# Patient Record
Sex: FEMALE | Race: WHITE | NOT HISPANIC OR LATINO | Employment: OTHER | URBAN - METROPOLITAN AREA
[De-identification: names, ages, dates, MRNs, and addresses within clinical notes are randomized per-mention and may not be internally consistent; named-entity substitution may affect disease eponyms.]

---

## 2022-06-01 RX ORDER — ESOMEPRAZOLE MAGNESIUM 40 MG/1
40 CAPSULE, DELAYED RELEASE ORAL 2 TIMES DAILY
COMMUNITY

## 2022-06-01 RX ORDER — METOPROLOL TARTRATE 50 MG/1
1 TABLET ORAL 2 TIMES DAILY
COMMUNITY
Start: 2021-09-07

## 2022-06-01 RX ORDER — CALCITRIOL 0.25 UG/1
1 CAPSULE ORAL DAILY
COMMUNITY
Start: 2021-10-12

## 2022-06-01 RX ORDER — HYDROCORTISONE 25 MG/G
1 CREAM TOPICAL 2 TIMES DAILY
COMMUNITY
Start: 2021-09-10

## 2022-06-01 RX ORDER — DABIGATRAN ETEXILATE 75 MG/1
1 CAPSULE ORAL DAILY
COMMUNITY
Start: 2021-08-02 | End: 2023-10-09 | Stop reason: ALTCHOICE

## 2022-06-01 RX ORDER — AMIODARONE HYDROCHLORIDE 200 MG/1
1 TABLET ORAL DAILY
COMMUNITY
Start: 2021-08-09

## 2022-06-01 RX ORDER — TRAMADOL HYDROCHLORIDE 50 MG/1
50 TABLET ORAL EVERY 6 HOURS PRN
COMMUNITY
Start: 2022-05-23 | End: 2023-01-31 | Stop reason: ALTCHOICE

## 2022-06-01 RX ORDER — AMLODIPINE BESYLATE 10 MG/1
1 TABLET ORAL DAILY
COMMUNITY
Start: 2021-09-07

## 2022-06-01 RX ORDER — SUCRALFATE 1 G/10ML
SUSPENSION ORAL
COMMUNITY
Start: 2022-02-14

## 2022-06-01 RX ORDER — HYDRALAZINE HYDROCHLORIDE 25 MG/1
25 TABLET, FILM COATED ORAL 2 TIMES DAILY
COMMUNITY
Start: 2021-06-30 | End: 2023-10-09

## 2022-06-01 RX ORDER — ONDANSETRON 4 MG/1
4 TABLET, ORALLY DISINTEGRATING ORAL EVERY 8 HOURS PRN
COMMUNITY
Start: 2022-04-11

## 2022-06-04 PROBLEM — N18.4 ANEMIA IN STAGE 4 CHRONIC KIDNEY DISEASE: Status: ACTIVE | Noted: 2022-06-04

## 2022-06-04 PROBLEM — Z79.1 LONG TERM (CURRENT) USE OF NON-STEROIDAL ANTI-INFLAMMATORIES (NSAID): Status: ACTIVE | Noted: 2022-06-04

## 2022-06-04 PROBLEM — N25.81 SECONDARY HYPERPARATHYROIDISM OF RENAL ORIGIN: Status: ACTIVE | Noted: 2022-06-04

## 2022-06-04 PROBLEM — R80.1 PERSISTENT PROTEINURIA: Status: ACTIVE | Noted: 2022-06-04

## 2022-06-04 PROBLEM — N18.4 CKD (CHRONIC KIDNEY DISEASE) STAGE 4, GFR 15-29 ML/MIN: Status: ACTIVE | Noted: 2022-06-04

## 2022-06-04 PROBLEM — I10 PRIMARY HYPERTENSION: Status: ACTIVE | Noted: 2022-06-04

## 2022-06-04 PROBLEM — D63.1 ANEMIA IN STAGE 4 CHRONIC KIDNEY DISEASE: Status: ACTIVE | Noted: 2022-06-04

## 2022-06-04 NOTE — PROGRESS NOTES
Pt Name:  Kateryna De Guzman  Pt :  1945  Pt MRN:  [id number]    Date: 2022    Reason for visit:     Follow up visit for Chronic Kidney Disease.    Serum creatinine 2.66, GFR 17, CKD stage 4.    Chief Complaint:     The patient denies any complaints today.      Assessment & Plan:      Problem #1.  Primary Hypertension    Assessment:     Controlled   Plan:     2 g sodium diet, amlodipine 10 mg by mouth daily, hydralazine 25 mg by mouth twice daily, metoprolol 50 mg by mouth twice daily.     Problem #2.  Long-term NSAID Use    Assessment:     Contributing to some extent to the development of the chronic kidney disease.     Plan:    The patient has been advised and continues to avoid the use of NSAIDs.     Problem #3.  Chronic Kidney Disease Stage 4    Assessment:     Stable and asymptomatic   Plan:    Conservative, non-dialysis, nephrologic managements.    Continue renal nutrition and fluid managements.    Continue to provide the patient with verbal instruction and reading material regarding the chronic kidney disease condition, and especially its associated cardiovascular risks.    Return for follow-up in 3 months with repeat kidney lab work done before the visit.     Problem #4.  Anemia in Chronic Kidney Disease    Assessment:    With the patient currently asymptomatic    A portion of the anemia from time to time is contributed to iron deficiency presumed due to the effects of her platelet inhibitor at the GI level.     Plan:    Repeat hemogram prior to the visit in 3 months.     Problem #5.  Secondary Hyperparathyroidism of Renal Origin.    Assessment:    Treated and asymptomatic at   Plan:    Calcitriol 0.25 mcg by mouth daily.    Repeat calcium, phosphorus, albumin, and intact PTH level prior to the visit in 3 months.     Problem #6.  Persistent Proteinuria    Assessment:    Asymptomatic   Plan:     Repeat random urine protein/creatinine prior to the visit in 3 months.         HPI:    This is the   [facility] [place] visit for this 77-year-old woman referred by Dr. Dimas Cole on 08/26/2015 for further evaluation of reduced kidney function;the most recent blood testing demonstrating a serum creatinine of 1.6 with a calculated glomerular filtration rate of 33 mL/min.at that time.    She does continue to have a great deal refer need to look after her , and knows that she has not been drinking the recommended him amounts of water.    At presentation to the clinic today for followup of the status of her kidney function, she does not have azotemic symptoms. Specifically, she does not have absent appetite, nausea , dyspnea at rest or with modest exertion, shortness of breath awakening her from sleep, or difficulty formulating her thoughts or expressing herself.      From the standpoint of the risk factors for the development of Chronic Kidney Disease, she has an at least 20 year history of hypertension. In addition, she related at the time of her initial visit in 2015, that for about the previous 2 years she had been using Aleve PM at bedtime for sleep. She had ceased smoking inhaled tobacco products 2 years previously. She did have known hypercholesterolemia requiring treatment, and a cardiac catheterization had demonstrated a small coronary artery with significant obstruction that was not amenable to intervention. There was not a significant family history of kidney disease of patient is aware. She had no azotemic symptoms at that time.       History:     Past Medical History:   Diagnosis Date    Chronic kidney disease, unspecified     Diverticulosis     Essential (primary) hypertension     Gastritis 08/05/2014    GERD (gastroesophageal reflux disease)     Internal hemorrhoids     Irritable bowel syndrome without diarrhea     Mixed hyperlipidemia     Paroxysmal atrial fibrillation     Personal history of colonic polyps 12/09/2008    HYPERPLASTIC POLYPS    Personal history of  colonic polyps 08/30/2011    TUBULAR ADENOMA,MULTIPLE HYPERPLASTIC POLYPS    Personal history of colonic polyps 08/05/2014    3 hyperplastic    Personal history of colonic polyps 01/17/2017    2 hyperplastic,1 tubular adenoma    Personal history of colonic polyps 04/09/2019    2 tubular adenomous, 2 hyperplastic     Past Surgical History:   Procedure Laterality Date    CARDIAC CATHETERIZATION      CARDIAC PACEMAKER PLACEMENT  04/2016    CARDIOVERSION      CATARACT EXTRACTION      CHOLECYSTECTOMY      COLONOSCOPY      4/9/19, 1/17/2017,8/5/2014,8/30/2011,12/9/2008    ESOPHAGOGASTRODUODENOSCOPY      4/9/19, 1/17/2017,8/5/2014    HYSTERECTOMY       Family History   Problem Relation Age of Onset    Kidney disease Mother     Cancer Father         colon    Heart failure Father      Social History     Substance and Sexual Activity   Alcohol Use Yes    Alcohol/week: 2.0 standard drinks    Types: 2 Glasses of wine per week     Social History     Substance and Sexual Activity   Drug Use Not Currently     Social History     Substance and Sexual Activity   Sexual Activity Not Currently     reports previously being sexually active.  Social History     Tobacco Use   Smoking Status Former Smoker    Quit date: 1/19/2012    Years since quitting: 10.3   Smokeless Tobacco Never Used       Allergies:    Review of patient's allergies indicates:   Allergen Reactions    Iodine and iodide containing products     Levofloxacin     Penicillins          Current Outpatient Medications:     amiodarone (PACERONE) 200 MG Tab, Take 1 tablet by mouth once daily., Disp: , Rfl:     amLODIPine (NORVASC) 10 MG tablet, Take 1 tablet by mouth once daily., Disp: , Rfl:     calcitRIOL (ROCALTROL) 0.25 MCG Cap, Take 1 capsule by mouth once daily., Disp: , Rfl:     dabigatran etexilate (PRADAXA) 75 mg Cap, Take 1 capsule by mouth once daily., Disp: , Rfl:     desvenlafaxine succinate (PRISTIQ) 100 MG Tb24, Take 100 mg by mouth once  daily., Disp: , Rfl:     esomeprazole (NEXIUM) 40 MG capsule, Take 40 mg by mouth 2 (two) times a day., Disp: , Rfl:     famotidine (PEPCID) 40 MG tablet, Take 40 mg by mouth once daily., Disp: , Rfl:     ferrous sulfate (FEOSOL) 325 mg (65 mg iron) Tab tablet, Take 325 mg by mouth 3 (three) times daily., Disp: , Rfl:     furosemide (LASIX) 20 MG tablet, Take 20 mg by mouth once daily., Disp: , Rfl:     hydrALAZINE (APRESOLINE) 25 MG tablet, Take 25 mg by mouth 2 (two) times a day., Disp: , Rfl:     metoprolol tartrate (LOPRESSOR) 50 MG tablet, Take 1 tablet by mouth 2 (two) times daily., Disp: , Rfl:     omega-3 fatty acids-fish oil 340-1,000 mg Cap, Take 2 capsules by mouth 2 (two) times a day., Disp: , Rfl:     sucralfate (CARAFATE) 100 mg/mL suspension, TAKE 10MLs BY MOUTH FOUR TIMES DAILY BEFORE MEALS AND nightly AS NEEDED], Disp: , Rfl:     suvorexant (BELSOMRA) 10 mg Tab, Take by mouth every evening., Disp: , Rfl:     traMADoL (ULTRAM) 50 mg tablet, Take 50 mg by mouth every 6 (six) hours as needed., Disp: , Rfl:     vitamins  A,C,E-zinc-copper 14,320-226-200 unit-mg-unit Cap, Take 2 capsules by mouth once daily., Disp: , Rfl:     hydrocortisone 2.5 % cream, Apply 1 application topically 2 (two) times daily., Disp: , Rfl:     ondansetron (ZOFRAN-ODT) 4 MG TbDL, Take 4 mg by mouth every 8 (eight) hours as needed., Disp: , Rfl:     ROS:     Constitutional:  Denies fever or chills   Eyes:  Denies change in visual acuity   HENT:  Denies nasal congestion or sore throat   Respiratory:  As in the history of the present illness.   Cardiovascular:  As in the history of the present illness.   GI:  As in the history of the present illness.    Musculoskeletal:  Denies back pain or joint pain   Integument:  Denies rash   Neurologic:  Denies headache, focal weakness or sensory changes   Endocrine:  Denies polyuria or polydipsia   Lymphatic:  Denies swollen glands   Psychiatric:  Denies depression or  "anxiety    Physical Exam:     Vitals:   Vitals:    06/08/22 1352   BP: 127/81   Pulse: 65   Temp: 98 °F (36.7 °C)   SpO2: 96%   Weight: 64 kg (141 lb)   Height: 5' 4" (1.626 m)     Body mass index is 24.2 kg/m².    Constitutional:  Well developed, well nourished, and in no acute distress   Eyes:  PERRLA, conjunctiva normal   HENT:  Atraumatic, external ears normal, nose normal.  Neck: There is no jugular venous distension or thyromegaly.   Respiratory:  No respiratory distress, normal breath sounds, no rales, no wheezing   Cardiovascular:  Normal rate,and a regular rhythm, no murmurs, no gallops, no rub, and no edema.  GI:  Normal bowel sounds.  Musculoskeletal:  No deformities.   Neurologic:  Alert & oriented x 3, CN 2-12 normal, normal motor function, and no asterixis.   Psychiatric:  Speech and behavior appropriate.      Labs/Tests:    Date:     Electrolytes (Na/K/Cl/CO2) = 138/4.1/104/25  BUN/Creat/GFR = 36/2.66/17  Ca/Phos/Alb/PTH = 9.9/4.2//4.8  U Prot/creat = 0.8  Glucose/A1c =  105/  Hgb/Fe/Ferr/Tsat = 11.4/49/10/12         Follow Up:      Return for follow-up in 3 months with repeat kidney lab work done before the visit.      This note was created using the voice recognition software currently available to the Medical Staff of the [facility] and its health care facilities. All of the best efforts undertaken to edit the product of that use shall necessarily fall short from time to time. Viewers and reviewers of the product of its use are encouraged to contact this provider for clarification when, and if, the product message is unclear.  "

## 2022-06-08 ENCOUNTER — OFFICE VISIT (OUTPATIENT)
Dept: NEPHROLOGY | Facility: CLINIC | Age: 77
End: 2022-06-08
Payer: MEDICARE

## 2022-06-08 VITALS
OXYGEN SATURATION: 96 % | HEART RATE: 65 BPM | WEIGHT: 141 LBS | HEIGHT: 64 IN | SYSTOLIC BLOOD PRESSURE: 127 MMHG | DIASTOLIC BLOOD PRESSURE: 81 MMHG | BODY MASS INDEX: 24.07 KG/M2 | TEMPERATURE: 98 F

## 2022-06-08 DIAGNOSIS — Z79.1 LONG TERM (CURRENT) USE OF NON-STEROIDAL ANTI-INFLAMMATORIES (NSAID): ICD-10-CM

## 2022-06-08 DIAGNOSIS — N18.4 CKD (CHRONIC KIDNEY DISEASE) STAGE 4, GFR 15-29 ML/MIN: Primary | ICD-10-CM

## 2022-06-08 DIAGNOSIS — R80.1 PERSISTENT PROTEINURIA: ICD-10-CM

## 2022-06-08 DIAGNOSIS — N25.81 SECONDARY HYPERPARATHYROIDISM OF RENAL ORIGIN: ICD-10-CM

## 2022-06-08 DIAGNOSIS — I10 PRIMARY HYPERTENSION: ICD-10-CM

## 2022-06-08 DIAGNOSIS — D63.1 ANEMIA IN STAGE 4 CHRONIC KIDNEY DISEASE: ICD-10-CM

## 2022-06-08 DIAGNOSIS — N18.4 ANEMIA IN STAGE 4 CHRONIC KIDNEY DISEASE: ICD-10-CM

## 2022-06-08 PROCEDURE — 1101F PR PT FALLS ASSESS DOC 0-1 FALLS W/OUT INJ PAST YR: ICD-10-PCS | Mod: CPTII,,, | Performed by: INTERNAL MEDICINE

## 2022-06-08 PROCEDURE — 3288F PR FALLS RISK ASSESSMENT DOCUMENTED: ICD-10-PCS | Mod: CPTII,,, | Performed by: INTERNAL MEDICINE

## 2022-06-08 PROCEDURE — 3074F SYST BP LT 130 MM HG: CPT | Mod: CPTII,,, | Performed by: INTERNAL MEDICINE

## 2022-06-08 PROCEDURE — 1159F MED LIST DOCD IN RCRD: CPT | Mod: CPTII,,, | Performed by: INTERNAL MEDICINE

## 2022-06-08 PROCEDURE — 3074F PR MOST RECENT SYSTOLIC BLOOD PRESSURE < 130 MM HG: ICD-10-PCS | Mod: CPTII,,, | Performed by: INTERNAL MEDICINE

## 2022-06-08 PROCEDURE — 3079F PR MOST RECENT DIASTOLIC BLOOD PRESSURE 80-89 MM HG: ICD-10-PCS | Mod: CPTII,,, | Performed by: INTERNAL MEDICINE

## 2022-06-08 PROCEDURE — 1159F PR MEDICATION LIST DOCUMENTED IN MEDICAL RECORD: ICD-10-PCS | Mod: CPTII,,, | Performed by: INTERNAL MEDICINE

## 2022-06-08 PROCEDURE — 99214 PR OFFICE/OUTPT VISIT, EST, LEVL IV, 30-39 MIN: ICD-10-PCS | Mod: ,,, | Performed by: INTERNAL MEDICINE

## 2022-06-08 PROCEDURE — 1101F PT FALLS ASSESS-DOCD LE1/YR: CPT | Mod: CPTII,,, | Performed by: INTERNAL MEDICINE

## 2022-06-08 PROCEDURE — 3288F FALL RISK ASSESSMENT DOCD: CPT | Mod: CPTII,,, | Performed by: INTERNAL MEDICINE

## 2022-06-08 PROCEDURE — 3079F DIAST BP 80-89 MM HG: CPT | Mod: CPTII,,, | Performed by: INTERNAL MEDICINE

## 2022-06-08 PROCEDURE — 99214 OFFICE O/P EST MOD 30 MIN: CPT | Mod: ,,, | Performed by: INTERNAL MEDICINE

## 2022-06-08 RX ORDER — FUROSEMIDE 20 MG/1
20 TABLET ORAL DAILY
COMMUNITY
End: 2023-10-09 | Stop reason: ALTCHOICE

## 2022-06-08 RX ORDER — DESVENLAFAXINE 100 MG/1
75 TABLET, EXTENDED RELEASE ORAL DAILY
COMMUNITY
End: 2023-10-09 | Stop reason: ALTCHOICE

## 2022-06-08 RX ORDER — FAMOTIDINE 40 MG/1
40 TABLET, FILM COATED ORAL DAILY
COMMUNITY

## 2022-06-08 RX ORDER — FERROUS SULFATE 325(65) MG
325 TABLET ORAL DAILY
COMMUNITY

## 2022-06-08 NOTE — PATIENT INSTRUCTIONS
The patient has been provided with their current level of kidney function including eGFR and creatinine, and she is as return for follow-up in 3 months with repeat kidney lab work done before the visit..    In the meantime, she is reminded that she needs to take in the recommended 72 oz of fluid a day in order to avoid development of dehydration says her current laboratory data suggest that she may have done.    Is also said that she is having a bit of problem with urinary incontinence and is feeling washed out.  She has had is suggested seen Dr. Rushing to be certain she does not have a new urinary tract infection, and to discuss with him whether or not there might be some help for bladder function.    Skin also to restart taking ferrous sulfate 325 mg a day because the iron deficiency and low transferrin saturation.       We discussed the potential for common complications of CKD including anemia, electrolyte abnormalities, abnormal fluid balance, mineral bone disease and malnutrition.     We discussed strategies to slow the progression of their kidney disease including:  Avoid nephrotoxic agents. Avoid over-the-counter and prescription NSAIDs (Ibuprofren, Motrin, Naproxyn, Aleve, Mobic, Celebrex, Toradol, Advil). All of these can worsen kidney function, elevate BP, cause fluid retention/swelling and elevate potassium. Avoid iodine contrast agents and gadolinium, which can worsen kidney function and/or cause kidney failure. Avoid phosphosoda bowel preps which can worsen kidney function.  Work to improve modifiable risk factors. Aim for good control of blood glucose without episodes of hypoglycemia. Notify the provider managing your diabetes if your blood glucose < 60. Aim for good blood pressure control without episodes of hypotension. Call the office if your systolic blood pressure is consistently < 110. Aim for good control of your cholesterol.  AIC goal <7.0  BP goal <130/80  LDL chol goal <70        Keeping  these in goal range will help prevent progression of cardiovascular disease and chronic kidney disease.     We discussed dietary modifications:  Low sodium diet: 2 gm/d or less  Limit/avoid high potassium foods  Avoid potassium containing salt substitutes  Limit/avoid high phosphorus foods  Limit daily protein intake to 0.8-1 gm/kg of your ideal body weight.     We discussed lifestyle modifications:  Make sure you are drinking plenty of fluids--64 ounces (1/2 gallon) daily  Exercise at least 30 minutes 5 x per week (total 150 minutes per week), example brisk walking  Achieve and maintain a healthy weight (BMI 20-25)  Limit alcohol consumption to <2 drinks per day  Stop smoking  Make sure you stay current on important vaccines-- pneumonia vaccines (Pneumovax and Prevnar), flu vaccine, Hepatitis B (especially patients nearing renal replacement therapy and planning hemodialysis) and Covid-19 vaccine.      Recommendations:  Monitor your BP at home daily and record.  Bring readings to your next appt.  Call the office if your BP is persistently >130/80.  Seek urgent medical attention with signs and symptoms of uremia - extreme weakness, fatigue, confusion, anorexia, metallic taste in mouth, hiccoughs, cramping, itching, chest pain, swelling, or trouble sleeping.

## 2022-10-16 PROBLEM — R80.9 NON-NEPHROTIC RANGE PROTEINURIA: Status: ACTIVE | Noted: 2022-06-04

## 2022-10-16 NOTE — PROGRESS NOTES
Pt Name:  Kateryna De Guzman  Pt :  1945  Pt MRN:  [id number]    Date:  10/18/2022    Reason for visit:     Follow up visit for Chronic Kidney Disease.    Serum creatinine 2.38, GFR 19, CKD stage 4.    Chief Complaint:     The patient denies any complaints today.      Assessment & Plan:      Problem #1.  Primary Hypertension    Assessment:     Controlled   Plan:     2 g sodium diet, amlodipine 10 mg by mouth daily, hydralazine 25 mg by mouth twice daily, metoprolol 50 mg by mouth twice daily.     Problem #2.  Long-term NSAID Use    Assessment:     Contributing to some extent to the development of the chronic kidney disease.     Plan:    The patient has been advised and continues to avoid the use of NSAIDs.     Problem #3.  Chronic Kidney Disease Stage 4    Assessment:     Stable and asymptomatic   Plan:    Conservative, non-dialysis, nephrologic managements.    Continue renal nutrition and fluid managements.    Continue to provide the patient with verbal instruction and reading material regarding the chronic kidney disease condition, and especially its associated cardiovascular risks.    Return for follow-up in 3 months with repeat kidney lab work done before the visit.     Problem #4.  Anemia in Chronic Kidney Disease    Assessment:    With the patient currently asymptomatic    A portion of the anemia from time to time is contributed to iron deficiency presumed due to the effects of her platelet inhibitor at the GI level.     Plan:    Repeat hemogram prior to the visit in 3 months.     Problem #5.  Secondary Hyperparathyroidism of Renal Origin.    Assessment:    Treated and asymptomatic at   Plan:    Calcitriol 0.25 mcg by mouth daily.    Repeat calcium, phosphorus, albumin, and intact PTH level prior to the visit in 3 months.     Problem #6.  Non-Nephrotic Range Proteinuria    Assessment:    Asymptomatic   Plan:     Repeat random urine protein/creatinine prior to the visit in 3 months.         HPI:    This is  the 20th [facility] [place] visit for this 77-year-old woman referred by Dr. Dimas Cole on 08/26/2015 for further evaluation of reduced kidney function;the most recent blood testing demonstrating a serum creatinine of 1.6 with a calculated glomerular filtration rate of 33 mL/min.at that time.    She does continue to have a great deal of work to do looking after her , and knows that she has not been drinking the recommended amounts of water.    In the clinic today for followup of the status of her kidney function, she does not have absent appetite, nausea , dyspnea at rest or with modest exertion, shortness of breath awakening her from sleep, or difficulty formulating her thoughts or expressing herself.      From the standpoint of the risk factors for the development of Chronic Kidney Disease, she has an at least 20 year history of hypertension. In addition, she related at the time of her initial visit in 2015, that for about the previous 2 years she had been using Aleve PM at bedtime for sleep. She had ceased smoking inhaled tobacco products 2 years previously. She did have known hypercholesterolemia requiring treatment, and a cardiac catheterization had demonstrated a small coronary artery with significant obstruction that was not amenable to intervention. There was not a significant family history of kidney disease of patient is aware. She had no azotemic symptoms at that time.       History:     Past Medical History:   Diagnosis Date    Chronic kidney disease, unspecified     Diverticulosis     Essential (primary) hypertension     Gastritis 08/05/2014    GERD (gastroesophageal reflux disease)     Internal hemorrhoids     Irritable bowel syndrome without diarrhea     Mixed hyperlipidemia     Paroxysmal atrial fibrillation     Personal history of colonic polyps 12/09/2008    HYPERPLASTIC POLYPS    Personal history of colonic polyps 08/30/2011    TUBULAR ADENOMA,MULTIPLE HYPERPLASTIC  POLYPS    Personal history of colonic polyps 08/05/2014    3 hyperplastic    Personal history of colonic polyps 01/17/2017    2 hyperplastic,1 tubular adenoma    Personal history of colonic polyps 04/09/2019    2 tubular adenomous, 2 hyperplastic     Past Surgical History:   Procedure Laterality Date    CARDIAC CATHETERIZATION      CARDIAC PACEMAKER PLACEMENT  04/2016    CARDIOVERSION      CATARACT EXTRACTION      CHOLECYSTECTOMY      COLONOSCOPY      4/9/19, 1/17/2017,8/5/2014,8/30/2011,12/9/2008    ESOPHAGOGASTRODUODENOSCOPY      4/9/19, 1/17/2017,8/5/2014    HYSTERECTOMY       Family History   Problem Relation Age of Onset    Kidney disease Mother     Cancer Father         colon    Heart failure Father      Social History     Substance and Sexual Activity   Alcohol Use Yes    Alcohol/week: 2.0 standard drinks    Types: 2 Glasses of wine per week     Social History     Substance and Sexual Activity   Drug Use Not Currently     Social History     Substance and Sexual Activity   Sexual Activity Not Currently     reports that she is not currently sexually active.  Social History     Tobacco Use   Smoking Status Former    Types: Cigarettes    Quit date: 1/19/2012    Years since quitting: 10.7   Smokeless Tobacco Never       Allergies:    Review of patient's allergies indicates:   Allergen Reactions    Iodine and iodide containing products     Levofloxacin Rash    Penicillins Rash         Current Outpatient Medications:     amiodarone (PACERONE) 200 MG Tab, Take 1 tablet by mouth once daily., Disp: , Rfl:     amLODIPine (NORVASC) 10 MG tablet, Take 1 tablet by mouth once daily., Disp: , Rfl:     calcitRIOL (ROCALTROL) 0.25 MCG Cap, Take 1 capsule by mouth once daily., Disp: , Rfl:     dabigatran etexilate (PRADAXA) 75 mg Cap, Take 1 capsule by mouth once daily., Disp: , Rfl:     desvenlafaxine succinate (PRISTIQ) 100 MG Tb24, Take 100 mg by mouth once daily., Disp: , Rfl:     esomeprazole (NEXIUM) 40 MG capsule, Take  40 mg by mouth 2 (two) times a day., Disp: , Rfl:     famotidine (PEPCID) 40 MG tablet, Take 40 mg by mouth once daily., Disp: , Rfl:     ferrous sulfate (FEOSOL) 325 mg (65 mg iron) Tab tablet, Take 325 mg by mouth once daily at 6am., Disp: , Rfl:     furosemide (LASIX) 20 MG tablet, Take 20 mg by mouth once daily., Disp: , Rfl:     hydrALAZINE (APRESOLINE) 25 MG tablet, Take 25 mg by mouth 2 (two) times a day., Disp: , Rfl:     hydrocortisone 2.5 % cream, Apply 1 application topically 2 (two) times daily., Disp: , Rfl:     metoprolol tartrate (LOPRESSOR) 50 MG tablet, Take 1 tablet by mouth 2 (two) times daily., Disp: , Rfl:     NON FORMULARY MEDICATION, Take 1 tablet by mouth once daily. prevagen, Disp: , Rfl:     omega-3 fatty acids-fish oil 340-1,000 mg Cap, Take 2 capsules by mouth 2 (two) times a day., Disp: , Rfl:     ondansetron (ZOFRAN-ODT) 4 MG TbDL, Take 4 mg by mouth every 8 (eight) hours as needed., Disp: , Rfl:     sucralfate (CARAFATE) 100 mg/mL suspension, TAKE 10MLs BY MOUTH FOUR TIMES DAILY BEFORE MEALS AND nightly AS NEEDED], Disp: , Rfl:     suvorexant (BELSOMRA) 10 mg Tab, Take by mouth every evening., Disp: , Rfl:     vitamins  A,C,E-zinc-copper 14,320-226-200 unit-mg-unit Cap, Take 2 capsules by mouth once daily., Disp: , Rfl:     traMADoL (ULTRAM) 50 mg tablet, Take 50 mg by mouth every 6 (six) hours as needed., Disp: , Rfl:     ROS:     Constitutional:  Denies fever or chills   Eyes:  Denies change in visual acuity   HENT:  Denies nasal congestion or sore throat   Respiratory:  As in the history of the present illness.   Cardiovascular:  As in the history of the present illness.   GI:  As in the history of the present illness.    Musculoskeletal:  Denies back pain or joint pain   Integument:  Denies rash   Neurologic:  Denies headache, focal weakness or sensory changes   Endocrine:  Denies polyuria or polydipsia   Lymphatic:  Denies swollen glands   Psychiatric:  Denies depression or  "anxiety    Physical Exam:     Vitals:   Vitals:    10/18/22 1537   BP: (!) 146/84   Pulse: 64   SpO2: 97%   Weight: 62.2 kg (137 lb 3.2 oz)   Height: 5' 4" (1.626 m)   PainSc: 0-No pain       Body mass index is 23.55 kg/m².    Constitutional:  Well developed, well nourished, and in no acute distress   Eyes:  PERRLA, conjunctiva normal   HENT:  Atraumatic, external ears normal, nose normal.  Neck: There is no jugular venous distension or thyromegaly.   Respiratory:  No respiratory distress, normal breath sounds, no rales, no wheezing   Cardiovascular:  Normal rate,and a regular rhythm, no murmurs, no gallops, no rub, and trace bilateral ankle edema.  GI:  Normal bowel sounds.  Musculoskeletal:  No deformities.   Neurologic:  Alert & oriented x 3, CN 2-12 normal, normal motor function, and no asterixis.   Psychiatric:  Speech and behavior appropriate.      Labs/Tests:    Date:  10/17/2022    Electrolytes (Na/K/Cl/CO2) = 138/4.2/105/25  BUN/Creat/GFR = 30/2.38/19  Ca/Phos/Alb/PTH = 9.5/4.1/4.2/238  U Prot/creat = 1.2  Hgb = 10.7      Follow Up:      Return for follow-up in 3 months with repeat kidney lab work done before the visit.      This note was created using the voice recognition software currently available to the Medical Staff of the [facility] and its health care facilities. All of the best efforts undertaken to edit the product of that use shall necessarily fall short from time to time. Viewers and reviewers of the product of its use are encouraged to contact this provider for clarification when, and if, the product message is unclear.    "

## 2022-10-18 ENCOUNTER — OFFICE VISIT (OUTPATIENT)
Dept: NEPHROLOGY | Facility: CLINIC | Age: 77
End: 2022-10-18
Payer: MEDICARE

## 2022-10-18 VITALS
SYSTOLIC BLOOD PRESSURE: 146 MMHG | OXYGEN SATURATION: 97 % | HEART RATE: 64 BPM | HEIGHT: 64 IN | DIASTOLIC BLOOD PRESSURE: 84 MMHG | BODY MASS INDEX: 23.42 KG/M2 | WEIGHT: 137.19 LBS

## 2022-10-18 DIAGNOSIS — Z79.1 LONG TERM (CURRENT) USE OF NON-STEROIDAL ANTI-INFLAMMATORIES (NSAID): ICD-10-CM

## 2022-10-18 DIAGNOSIS — D63.1 ANEMIA IN STAGE 4 CHRONIC KIDNEY DISEASE: ICD-10-CM

## 2022-10-18 DIAGNOSIS — N25.81 SECONDARY HYPERPARATHYROIDISM OF RENAL ORIGIN: ICD-10-CM

## 2022-10-18 DIAGNOSIS — N18.4 CKD (CHRONIC KIDNEY DISEASE) STAGE 4, GFR 15-29 ML/MIN: Primary | ICD-10-CM

## 2022-10-18 DIAGNOSIS — R80.9 NON-NEPHROTIC RANGE PROTEINURIA: ICD-10-CM

## 2022-10-18 DIAGNOSIS — I10 PRIMARY HYPERTENSION: ICD-10-CM

## 2022-10-18 DIAGNOSIS — N18.4 ANEMIA IN STAGE 4 CHRONIC KIDNEY DISEASE: ICD-10-CM

## 2022-10-18 PROCEDURE — 1101F PR PT FALLS ASSESS DOC 0-1 FALLS W/OUT INJ PAST YR: ICD-10-PCS | Mod: CPTII,,, | Performed by: INTERNAL MEDICINE

## 2022-10-18 PROCEDURE — 3288F PR FALLS RISK ASSESSMENT DOCUMENTED: ICD-10-PCS | Mod: CPTII,,, | Performed by: INTERNAL MEDICINE

## 2022-10-18 PROCEDURE — 3288F FALL RISK ASSESSMENT DOCD: CPT | Mod: CPTII,,, | Performed by: INTERNAL MEDICINE

## 2022-10-18 PROCEDURE — 3077F SYST BP >= 140 MM HG: CPT | Mod: CPTII,,, | Performed by: INTERNAL MEDICINE

## 2022-10-18 PROCEDURE — 1126F PR PAIN SEVERITY QUANTIFIED, NO PAIN PRESENT: ICD-10-PCS | Mod: CPTII,,, | Performed by: INTERNAL MEDICINE

## 2022-10-18 PROCEDURE — 3079F PR MOST RECENT DIASTOLIC BLOOD PRESSURE 80-89 MM HG: ICD-10-PCS | Mod: CPTII,,, | Performed by: INTERNAL MEDICINE

## 2022-10-18 PROCEDURE — 1126F AMNT PAIN NOTED NONE PRSNT: CPT | Mod: CPTII,,, | Performed by: INTERNAL MEDICINE

## 2022-10-18 PROCEDURE — 3077F PR MOST RECENT SYSTOLIC BLOOD PRESSURE >= 140 MM HG: ICD-10-PCS | Mod: CPTII,,, | Performed by: INTERNAL MEDICINE

## 2022-10-18 PROCEDURE — 1159F MED LIST DOCD IN RCRD: CPT | Mod: CPTII,,, | Performed by: INTERNAL MEDICINE

## 2022-10-18 PROCEDURE — 3079F DIAST BP 80-89 MM HG: CPT | Mod: CPTII,,, | Performed by: INTERNAL MEDICINE

## 2022-10-18 PROCEDURE — 99214 PR OFFICE/OUTPT VISIT, EST, LEVL IV, 30-39 MIN: ICD-10-PCS | Mod: ,,, | Performed by: INTERNAL MEDICINE

## 2022-10-18 PROCEDURE — 99214 OFFICE O/P EST MOD 30 MIN: CPT | Mod: ,,, | Performed by: INTERNAL MEDICINE

## 2022-10-18 PROCEDURE — 1159F PR MEDICATION LIST DOCUMENTED IN MEDICAL RECORD: ICD-10-PCS | Mod: CPTII,,, | Performed by: INTERNAL MEDICINE

## 2022-10-18 PROCEDURE — 1101F PT FALLS ASSESS-DOCD LE1/YR: CPT | Mod: CPTII,,, | Performed by: INTERNAL MEDICINE

## 2022-10-18 NOTE — PATIENT INSTRUCTIONS
The patient is provided with her current level of kidney function, and she is asked to retrun in 3 months with repeat kidney function lab data obtained before the visit.    She has had discussed the 2 types of available dialysis treatments, is provided with a YouTube tutorial on dialysis options, and is referred to the outpatient home dialysis clinic for an in-person review of what is available, especially that of peritoneal dialysis for which she is ideally suited.

## 2023-01-31 ENCOUNTER — OFFICE VISIT (OUTPATIENT)
Dept: NEPHROLOGY | Facility: CLINIC | Age: 78
End: 2023-01-31
Payer: MEDICARE

## 2023-01-31 VITALS
HEIGHT: 64 IN | WEIGHT: 134 LBS | DIASTOLIC BLOOD PRESSURE: 65 MMHG | HEART RATE: 72 BPM | BODY MASS INDEX: 22.88 KG/M2 | SYSTOLIC BLOOD PRESSURE: 140 MMHG | OXYGEN SATURATION: 97 %

## 2023-01-31 DIAGNOSIS — N18.4 ANEMIA IN STAGE 4 CHRONIC KIDNEY DISEASE: ICD-10-CM

## 2023-01-31 DIAGNOSIS — D63.1 ANEMIA IN STAGE 4 CHRONIC KIDNEY DISEASE: ICD-10-CM

## 2023-01-31 DIAGNOSIS — Z79.1 LONG TERM (CURRENT) USE OF NON-STEROIDAL ANTI-INFLAMMATORIES (NSAID): ICD-10-CM

## 2023-01-31 DIAGNOSIS — N25.81 SECONDARY HYPERPARATHYROIDISM OF RENAL ORIGIN: ICD-10-CM

## 2023-01-31 DIAGNOSIS — N18.4 CKD (CHRONIC KIDNEY DISEASE) STAGE 4, GFR 15-29 ML/MIN: Primary | ICD-10-CM

## 2023-01-31 DIAGNOSIS — I10 PRIMARY HYPERTENSION: ICD-10-CM

## 2023-01-31 PROCEDURE — 1101F PT FALLS ASSESS-DOCD LE1/YR: CPT | Mod: CPTII,,, | Performed by: INTERNAL MEDICINE

## 2023-01-31 PROCEDURE — 3077F PR MOST RECENT SYSTOLIC BLOOD PRESSURE >= 140 MM HG: ICD-10-PCS | Mod: CPTII,,, | Performed by: INTERNAL MEDICINE

## 2023-01-31 PROCEDURE — 3288F PR FALLS RISK ASSESSMENT DOCUMENTED: ICD-10-PCS | Mod: CPTII,,, | Performed by: INTERNAL MEDICINE

## 2023-01-31 PROCEDURE — 99214 OFFICE O/P EST MOD 30 MIN: CPT | Mod: ,,, | Performed by: INTERNAL MEDICINE

## 2023-01-31 PROCEDURE — 3077F SYST BP >= 140 MM HG: CPT | Mod: CPTII,,, | Performed by: INTERNAL MEDICINE

## 2023-01-31 PROCEDURE — 3078F DIAST BP <80 MM HG: CPT | Mod: CPTII,,, | Performed by: INTERNAL MEDICINE

## 2023-01-31 PROCEDURE — 3078F PR MOST RECENT DIASTOLIC BLOOD PRESSURE < 80 MM HG: ICD-10-PCS | Mod: CPTII,,, | Performed by: INTERNAL MEDICINE

## 2023-01-31 PROCEDURE — 1159F MED LIST DOCD IN RCRD: CPT | Mod: CPTII,,, | Performed by: INTERNAL MEDICINE

## 2023-01-31 PROCEDURE — 3288F FALL RISK ASSESSMENT DOCD: CPT | Mod: CPTII,,, | Performed by: INTERNAL MEDICINE

## 2023-01-31 PROCEDURE — 1159F PR MEDICATION LIST DOCUMENTED IN MEDICAL RECORD: ICD-10-PCS | Mod: CPTII,,, | Performed by: INTERNAL MEDICINE

## 2023-01-31 PROCEDURE — 99214 PR OFFICE/OUTPT VISIT, EST, LEVL IV, 30-39 MIN: ICD-10-PCS | Mod: ,,, | Performed by: INTERNAL MEDICINE

## 2023-01-31 PROCEDURE — 1101F PR PT FALLS ASSESS DOC 0-1 FALLS W/OUT INJ PAST YR: ICD-10-PCS | Mod: CPTII,,, | Performed by: INTERNAL MEDICINE

## 2023-01-31 RX ORDER — TIZANIDINE 4 MG/1
4 TABLET ORAL NIGHTLY PRN
COMMUNITY
Start: 2022-10-18 | End: 2023-10-09 | Stop reason: ALTCHOICE

## 2023-01-31 NOTE — PATIENT INSTRUCTIONS
The patient has been provided with her current level of kidney function that is a bit improved compared to the last determination.    She is asked to consider returning to the use of her diuretic on an as-needed basis rather than daily, especially as she has no swelling today at all.    In addition, she is reminded to take in 72 oz of fluid a day in order to avoid development of dehydration.    Because of the current potassium level, she is provided with a low-potassium diet and the level will be rechecked when she returns..

## 2023-01-31 NOTE — PROGRESS NOTES
Pt Name:  Kateryna De Guzman  Pt :  1945  Pt MRN:  [id number]    Date:  10/18/2022    Reason for visit:     Follow up visit for Chronic Kidney Disease.    Serum creatinine 3.01, GFR 16, CKD stage 4.    Chief Complaint:     The patient denies any complaints today.      Assessment & Plan:      Problem #1.  Primary Hypertension    Assessment:     Controlled   Plan:     2 g sodium diet, amlodipine 10 mg by mouth daily, hydralazine 25 mg by mouth twice daily, metoprolol 50 mg by mouth twice daily.     Problem #2.  Long-term NSAID Use    Assessment:     Contributing to some extent to the development of the chronic kidney disease.     Plan:    The patient has been advised and continues to avoid the use of NSAIDs.     Problem #3.  Chronic Kidney Disease Stage 4    Assessment:     Stable and asymptomatic   Plan:    Conservative, non-dialysis, nephrologic managements.    Continue renal nutrition and fluid managements.    Continue to provide the patient with verbal instruction and reading material regarding the chronic kidney disease condition, and especially its associated cardiovascular risks.    Return for follow-up in 4 months with repeat kidney lab work done before the visit.     Problem #4.  Anemia in Chronic Kidney Disease    Assessment:    With the patient currently asymptomatic    A portion of the anemia from time to time is contributed to iron deficiency presumed due to the effects of her platelet inhibitor at the GI level.     Plan:    Repeat hemogram prior to the visit in 4 months.     Problem #5.  Secondary Hyperparathyroidism of Renal Origin.    Assessment:    Treated and asymptomatic at   Plan:    Calcitriol 0.25 mcg by mouth daily.    Repeat calcium, phosphorus, albumin, and intact PTH level prior to the visit in 4 months.     Problem #6.  Non-Nephrotic Range Proteinuria    Assessment:    Asymptomatic   Plan:     Repeat random urine protein/creatinine prior to the visit in 4 months.     Problem #6.   Hyperkalemia    Assessment:    Asymptomatic   Plan:   Patient has been provided with a low-potassium diet.      Repeat serum potassium prior to the visit in 4 months.       HPI:    This is the [facility]st [facility] [place] visit for this 77-year-old woman referred by Dr. Dimas Cole on 08/26/2015 for further evaluation of reduced kidney function;the most recent blood testing demonstrating a serum creatinine of 1.6 with a calculated glomerular filtration rate of 33 mL/min.at that time.    She is a bit at loose ends following the death of her .  She is in the course of moving in with her daughter.    At presentation to the clinic today for followup of the status of her kidney function, she does not have azotemic symptoms.  Specifically, she does not have absent appetite, nausea , dyspnea at rest or with modest exertion, shortness of breath awakening her from sleep, or difficulty formulating her thoughts or expressing herself.      From the standpoint of the risk factors for the development of Chronic Kidney Disease, she has an at least 20 year history of hypertension. In addition, she related at the time of her initial visit in 2015, that for about the previous 2 years she had been using Aleve PM at bedtime for sleep. She had ceased smoking inhaled tobacco products 2 years previously. She did have known hypercholesterolemia requiring treatment, and a cardiac catheterization had demonstrated a small coronary artery with significant obstruction that was not amenable to intervention. There was not a significant family history of kidney disease of patient is aware. She had no azotemic symptoms at that time.       History:     Past Medical History:   Diagnosis Date    Chronic kidney disease, unspecified     Diverticulosis     Essential (primary) hypertension     Gastritis 08/05/2014    GERD (gastroesophageal reflux disease)     Internal hemorrhoids     Irritable bowel syndrome without diarrhea      Mixed hyperlipidemia     Paroxysmal atrial fibrillation     Personal history of colonic polyps 2008    HYPERPLASTIC POLYPS    Personal history of colonic polyps 2011    TUBULAR ADENOMA,MULTIPLE HYPERPLASTIC POLYPS    Personal history of colonic polyps 2014    3 hyperplastic    Personal history of colonic polyps 2017    2 hyperplastic,1 tubular adenoma    Personal history of colonic polyps 2019    2 tubular adenomous, 2 hyperplastic     Past Surgical History:   Procedure Laterality Date    CARDIAC CATHETERIZATION      CARDIAC PACEMAKER PLACEMENT  2016    CARDIOVERSION      CATARACT EXTRACTION      CHOLECYSTECTOMY      COLONOSCOPY      19, 2017,2014,2011,2008    ESOPHAGOGASTRODUODENOSCOPY      19, 2017,2014    HYSTERECTOMY       Family History   Problem Relation Age of Onset    Kidney disease Mother     Cancer Father         colon    Heart failure Father      Social History     Substance and Sexual Activity   Alcohol Use Yes    Alcohol/week: 2.0 standard drinks    Types: 2 Glasses of wine per week     Social History     Substance and Sexual Activity   Drug Use Not Currently     Social History     Substance and Sexual Activity   Sexual Activity Not Currently     reports that she is not currently sexually active.  Social History     Tobacco Use   Smoking Status Former    Types: Cigarettes    Quit date: 2012    Years since quittin.0   Smokeless Tobacco Never       Allergies:    Review of patient's allergies indicates:   Allergen Reactions    Iodine and iodide containing products     Levofloxacin Rash    Penicillins Rash         Current Outpatient Medications:     amiodarone (PACERONE) 200 MG Tab, Take 1 tablet by mouth once daily., Disp: , Rfl:     amLODIPine (NORVASC) 10 MG tablet, Take 1 tablet by mouth once daily., Disp: , Rfl:     calcitRIOL (ROCALTROL) 0.25 MCG Cap, Take 1 capsule by mouth once daily., Disp: , Rfl:     dabigatran  etexilate (PRADAXA) 75 mg Cap, Take 1 capsule by mouth once daily., Disp: , Rfl:     desvenlafaxine succinate (PRISTIQ) 100 MG Tb24, Take 100 mg by mouth once daily., Disp: , Rfl:     esomeprazole (NEXIUM) 40 MG capsule, Take 40 mg by mouth 2 (two) times a day., Disp: , Rfl:     famotidine (PEPCID) 40 MG tablet, Take 40 mg by mouth once daily., Disp: , Rfl:     ferrous sulfate (FEOSOL) 325 mg (65 mg iron) Tab tablet, Take 325 mg by mouth once daily at 6am., Disp: , Rfl:     furosemide (LASIX) 20 MG tablet, Take 20 mg by mouth once daily., Disp: , Rfl:     hydrALAZINE (APRESOLINE) 25 MG tablet, Take 25 mg by mouth 2 (two) times a day., Disp: , Rfl:     metoprolol tartrate (LOPRESSOR) 50 MG tablet, Take 1 tablet by mouth 2 (two) times daily., Disp: , Rfl:     NON FORMULARY MEDICATION, Take 1 tablet by mouth once daily. prevagen, Disp: , Rfl:     omega-3 fatty acids-fish oil 340-1,000 mg Cap, Take 2 capsules by mouth 2 (two) times a day., Disp: , Rfl:     sucralfate (CARAFATE) 100 mg/mL suspension, TAKE 10MLs BY MOUTH FOUR TIMES DAILY BEFORE MEALS AND nightly AS NEEDED], Disp: , Rfl:     suvorexant (BELSOMRA) 10 mg Tab, Take by mouth every evening., Disp: , Rfl:     vitamins  A,C,E-zinc-copper 14,320-226-200 unit-mg-unit Cap, Take 2 capsules by mouth once daily., Disp: , Rfl:     hydrocortisone 2.5 % cream, Apply 1 application topically 2 (two) times daily., Disp: , Rfl:     ondansetron (ZOFRAN-ODT) 4 MG TbDL, Take 4 mg by mouth every 8 (eight) hours as needed., Disp: , Rfl:     tiZANidine (ZANAFLEX) 4 MG tablet, Take 4 mg by mouth nightly as needed., Disp: , Rfl:     traMADoL (ULTRAM) 50 mg tablet, Take 50 mg by mouth every 6 (six) hours as needed., Disp: , Rfl:     ROS:     Constitutional:  Denies fever or chills   Eyes:  Denies change in visual acuity   HENT:  Denies nasal congestion or sore throat   Respiratory:  As in the history of the present illness.   Cardiovascular:  As in the history of the present illness.  "  GI:  As in the history of the present illness.    Musculoskeletal:  Denies back pain or joint pain   Integument:  Denies rash   Neurologic:  Denies headache, focal weakness or sensory changes   Endocrine:  Denies polyuria or polydipsia   Lymphatic:  Denies swollen glands   Psychiatric:  Denies depression or anxiety    Physical Exam:     Vitals:   Vitals:    01/31/23 0958   BP: (!) 140/65   Pulse: 72   SpO2: 97%   Weight: 60.8 kg (134 lb)   Height: 5' 4" (1.626 m)       Body mass index is 23 kg/m².    Constitutional:  Well developed, well nourished, and in no acute distress   Eyes:  PERRLA, conjunctiva normal   HENT:  Atraumatic, external ears normal, nose normal.  Neck: There is no jugular venous distension or thyromegaly.   Respiratory:  No respiratory distress, normal breath sounds, no rales, no wheezing   Cardiovascular:  Normal rate,and a regular rhythm, no murmurs, no gallops, no rub, and trace bilateral ankle edema.  GI:  Normal bowel sounds.  Musculoskeletal:  No deformities.   Neurologic:  Alert & oriented x 3, CN 2-12 normal, normal motor function, and no asterixis.   Psychiatric:  Speech and behavior appropriate.      Labs/Tests:    Date:  01/27/2023    Na/K/Cl/CO2 = 139/4.7/105/27  BUN/Creat/GFR = 34/3.01/16  Ca/Phos/Alb/PTH = 9.5/4.2/4.3/226  U Prot/creat = 1.3  Hgb = 9.5      Follow Up:      Return for follow-up in 4 months with repeat kidney lab work done before the visit.      This note was created using the voice recognition software currently available to the Medical Staff of the [facility] and its health care facilities. All of the best efforts undertaken to edit the product of that use shall necessarily fall short from time to time. Viewers and reviewers of the product of its use are encouraged to contact this provider for clarification when, and if, the product message is unclear.      "

## 2023-05-30 ENCOUNTER — OFFICE VISIT (OUTPATIENT)
Dept: NEPHROLOGY | Facility: CLINIC | Age: 78
End: 2023-05-30
Payer: MEDICARE

## 2023-05-30 VITALS
BODY MASS INDEX: 22.88 KG/M2 | HEIGHT: 64 IN | HEART RATE: 65 BPM | SYSTOLIC BLOOD PRESSURE: 135 MMHG | WEIGHT: 134 LBS | OXYGEN SATURATION: 97 % | DIASTOLIC BLOOD PRESSURE: 67 MMHG

## 2023-05-30 DIAGNOSIS — Z79.1 LONG TERM (CURRENT) USE OF NON-STEROIDAL ANTI-INFLAMMATORIES (NSAID): ICD-10-CM

## 2023-05-30 DIAGNOSIS — I10 PRIMARY HYPERTENSION: ICD-10-CM

## 2023-05-30 DIAGNOSIS — N18.5 CKD (CHRONIC KIDNEY DISEASE), SYMPTOM MANAGEMENT ONLY, STAGE 5: Primary | ICD-10-CM

## 2023-05-30 DIAGNOSIS — N18.5 ANEMIA IN STAGE 5 CHRONIC KIDNEY DISEASE, NOT ON CHRONIC DIALYSIS: ICD-10-CM

## 2023-05-30 DIAGNOSIS — N25.81 SECONDARY HYPERPARATHYROIDISM OF RENAL ORIGIN: ICD-10-CM

## 2023-05-30 DIAGNOSIS — D63.1 ANEMIA IN STAGE 5 CHRONIC KIDNEY DISEASE, NOT ON CHRONIC DIALYSIS: ICD-10-CM

## 2023-05-30 DIAGNOSIS — R80.9 NON-NEPHROTIC RANGE PROTEINURIA: ICD-10-CM

## 2023-05-30 PROCEDURE — 3078F PR MOST RECENT DIASTOLIC BLOOD PRESSURE < 80 MM HG: ICD-10-PCS | Mod: CPTII,S$GLB,, | Performed by: INTERNAL MEDICINE

## 2023-05-30 PROCEDURE — 1159F MED LIST DOCD IN RCRD: CPT | Mod: CPTII,S$GLB,, | Performed by: INTERNAL MEDICINE

## 2023-05-30 PROCEDURE — 1159F PR MEDICATION LIST DOCUMENTED IN MEDICAL RECORD: ICD-10-PCS | Mod: CPTII,S$GLB,, | Performed by: INTERNAL MEDICINE

## 2023-05-30 PROCEDURE — 99214 OFFICE O/P EST MOD 30 MIN: CPT | Mod: S$GLB,,, | Performed by: INTERNAL MEDICINE

## 2023-05-30 PROCEDURE — 3288F FALL RISK ASSESSMENT DOCD: CPT | Mod: CPTII,S$GLB,, | Performed by: INTERNAL MEDICINE

## 2023-05-30 PROCEDURE — 1126F PR PAIN SEVERITY QUANTIFIED, NO PAIN PRESENT: ICD-10-PCS | Mod: CPTII,S$GLB,, | Performed by: INTERNAL MEDICINE

## 2023-05-30 PROCEDURE — 1101F PT FALLS ASSESS-DOCD LE1/YR: CPT | Mod: CPTII,S$GLB,, | Performed by: INTERNAL MEDICINE

## 2023-05-30 PROCEDURE — 3075F SYST BP GE 130 - 139MM HG: CPT | Mod: CPTII,S$GLB,, | Performed by: INTERNAL MEDICINE

## 2023-05-30 PROCEDURE — 3075F PR MOST RECENT SYSTOLIC BLOOD PRESS GE 130-139MM HG: ICD-10-PCS | Mod: CPTII,S$GLB,, | Performed by: INTERNAL MEDICINE

## 2023-05-30 PROCEDURE — 1101F PR PT FALLS ASSESS DOC 0-1 FALLS W/OUT INJ PAST YR: ICD-10-PCS | Mod: CPTII,S$GLB,, | Performed by: INTERNAL MEDICINE

## 2023-05-30 PROCEDURE — 3288F PR FALLS RISK ASSESSMENT DOCUMENTED: ICD-10-PCS | Mod: CPTII,S$GLB,, | Performed by: INTERNAL MEDICINE

## 2023-05-30 PROCEDURE — 3078F DIAST BP <80 MM HG: CPT | Mod: CPTII,S$GLB,, | Performed by: INTERNAL MEDICINE

## 2023-05-30 PROCEDURE — 1126F AMNT PAIN NOTED NONE PRSNT: CPT | Mod: CPTII,S$GLB,, | Performed by: INTERNAL MEDICINE

## 2023-05-30 PROCEDURE — 99214 PR OFFICE/OUTPT VISIT, EST, LEVL IV, 30-39 MIN: ICD-10-PCS | Mod: S$GLB,,, | Performed by: INTERNAL MEDICINE

## 2023-05-30 NOTE — PATIENT INSTRUCTIONS
The patient has been provided with her current level of kidney function and she is asked to return for follow-up, this time, in 4 months with repeat kidney lab work done before the visit.    Meanwhile, she continues encouraged to take in 72 oz of fluid a day to avoid development of dehydration and a decrease in kidney function as a consequence.

## 2023-05-30 NOTE — PROGRESS NOTES
Pt Name:  Kateryna De Guzman  Pt :  1945  Pt MRN:  [id number]    Date:  10/18/2022    Reason for visit:     Follow up visit for Chronic Kidney Disease.    Serum creatinine 3.01, GFR 14, CKD stage 5.    Chief Complaint:     The patient denies any complaints today.      Assessment & Plan:      Problem #1.  Primary Hypertension    Assessment:     Controlled   Plan:     2 g sodium diet, amlodipine 10 mg by mouth daily, hydralazine 25 mg by mouth twice daily, metoprolol 50 mg by mouth twice daily.     Problem #2.  Long-term NSAID Use    Assessment:     Contributing to some extent to the development of the chronic kidney disease.     Plan:    The patient has been advised and continues to avoid the use of NSAIDs.     Problem #3.  Chronic Kidney Disease Stage 4    Assessment:     Stable and asymptomatic   Plan:    Conservative, non-dialysis, nephrologic managements.    Continue renal nutrition and fluid managements.    Continue to provide the patient with verbal instruction and reading material regarding the chronic kidney disease condition, and especially its associated cardiovascular risks.    Return for follow-up in 4 months with repeat kidney lab work done before the visit.     Problem #4.  Anemia in Chronic Kidney Disease    Assessment:    With the patient currently asymptomatic    A portion of the anemia from time to time is contributed to iron deficiency presumed due to the effects of her platelet inhibitor at the GI level.     Plan:    Repeat hemogram prior to the visit in 4 months.     Problem #5.  Secondary Hyperparathyroidism of Renal Origin.    Assessment:    Treated and asymptomatic at   Plan:    Calcitriol 0.25 mcg by mouth daily.    Repeat calcium, phosphorus, albumin, and intact PTH level prior to the visit in 4 months.     Problem #6.  Non-Nephrotic Range Proteinuria    Assessment:    Asymptomatic   Plan:     Repeat random urine protein/creatinine prior to the visit in 4 months.     Problem #6.   Hyperkalemia    Assessment:    Asymptomatic   Plan:   Patient has been provided with a low-potassium diet.      Repeat serum potassium prior to the visit in 4 months.       HPI:    This is the 22nd [facility] [place] visit for this 78-year-old woman referred by Dr. Dimas Cole on 08/26/2015 for further evaluation of reduced kidney function;the most recent blood testing demonstrating a serum creatinine of 1.6 with a calculated glomerular filtration rate of 33 mL/min.at that time.    She has moved in with her daughter, who admonishes her regularly about salt restriction and drinking at least 72 ounces of fluid per day..    In the clinic today for followup of the status of her kidney function, she does not have absent appetite, nausea , dyspnea at rest or with modest exertion, shortness of breath awakening her from sleep, or difficulty formulating her thoughts or expressing herself.      From the standpoint of the risk factors for the development of Chronic Kidney Disease, she has an at least 20 year history of hypertension. In addition, she related at the time of her initial visit in 2015, that for about the previous 2 years she had been using Aleve PM at bedtime for sleep. She had ceased smoking inhaled tobacco products 2 years previously. She did have known hypercholesterolemia requiring treatment, and a cardiac catheterization had demonstrated a small coronary artery with significant obstruction that was not amenable to intervention. There was not a significant family history of kidney disease of patient is aware. She had no azotemic symptoms at that time.       History:     Past Medical History:   Diagnosis Date    Chronic kidney disease, unspecified     Diverticulosis     Essential (primary) hypertension     Gastritis 08/05/2014    GERD (gastroesophageal reflux disease)     Internal hemorrhoids     Irritable bowel syndrome without diarrhea     Mixed hyperlipidemia     Paroxysmal atrial  fibrillation     Personal history of colonic polyps 2008    HYPERPLASTIC POLYPS    Personal history of colonic polyps 2011    TUBULAR ADENOMA,MULTIPLE HYPERPLASTIC POLYPS    Personal history of colonic polyps 2014    3 hyperplastic    Personal history of colonic polyps 2017    2 hyperplastic,1 tubular adenoma    Personal history of colonic polyps 2019    2 tubular adenomous, 2 hyperplastic     Past Surgical History:   Procedure Laterality Date    CARDIAC CATHETERIZATION      CARDIAC PACEMAKER PLACEMENT  2016    CARDIOVERSION      CATARACT EXTRACTION      CHOLECYSTECTOMY      COLONOSCOPY      19, 2017,2014,2011,2008    ESOPHAGOGASTRODUODENOSCOPY      19, 2017,2014    HYSTERECTOMY       Family History   Problem Relation Age of Onset    Kidney disease Mother     Cancer Father         colon    Heart failure Father      Social History     Substance and Sexual Activity   Alcohol Use Yes    Alcohol/week: 2.0 standard drinks    Types: 2 Glasses of wine per week     Social History     Substance and Sexual Activity   Drug Use Not Currently     Social History     Substance and Sexual Activity   Sexual Activity Not Currently     reports that she is not currently sexually active.  Social History     Tobacco Use   Smoking Status Former    Types: Cigarettes    Quit date: 2012    Years since quittin.3   Smokeless Tobacco Never       Allergies:    Review of patient's allergies indicates:   Allergen Reactions    Iodine and iodide containing products     Levofloxacin Rash    Penicillins Rash         Current Outpatient Medications:     amiodarone (PACERONE) 200 MG Tab, Take 1 tablet by mouth once daily., Disp: , Rfl:     amLODIPine (NORVASC) 10 MG tablet, Take 1 tablet by mouth once daily., Disp: , Rfl:     calcitRIOL (ROCALTROL) 0.25 MCG Cap, Take 1 capsule by mouth once daily., Disp: , Rfl:     dabigatran etexilate (PRADAXA) 75 mg Cap, Take 1 capsule by  mouth once daily., Disp: , Rfl:     desvenlafaxine succinate (PRISTIQ) 100 MG Tb24, Take 100 mg by mouth once daily., Disp: , Rfl:     esomeprazole (NEXIUM) 40 MG capsule, Take 40 mg by mouth 2 (two) times a day., Disp: , Rfl:     famotidine (PEPCID) 40 MG tablet, Take 40 mg by mouth once daily., Disp: , Rfl:     hydrALAZINE (APRESOLINE) 25 MG tablet, Take 25 mg by mouth 2 (two) times a day., Disp: , Rfl:     metoprolol tartrate (LOPRESSOR) 50 MG tablet, Take 1 tablet by mouth 2 (two) times daily., Disp: , Rfl:     NON FORMULARY MEDICATION, Take 1 tablet by mouth once daily. prevagen, Disp: , Rfl:     omega-3 fatty acids-fish oil 340-1,000 mg Cap, Take 2 capsules by mouth 2 (two) times a day., Disp: , Rfl:     ondansetron (ZOFRAN-ODT) 4 MG TbDL, Take 4 mg by mouth every 8 (eight) hours as needed., Disp: , Rfl:     suvorexant (BELSOMRA) 10 mg Tab, Take by mouth every evening., Disp: , Rfl:     vitamins  A,C,E-zinc-copper 14,320-226-200 unit-mg-unit Cap, Take 2 capsules by mouth once daily., Disp: , Rfl:     ferrous sulfate (FEOSOL) 325 mg (65 mg iron) Tab tablet, Take 325 mg by mouth once daily at 6am., Disp: , Rfl:     furosemide (LASIX) 20 MG tablet, Take 20 mg by mouth once daily., Disp: , Rfl:     hydrocortisone 2.5 % cream, Apply 1 application topically 2 (two) times daily., Disp: , Rfl:     sucralfate (CARAFATE) 100 mg/mL suspension, TAKE 10MLs BY MOUTH FOUR TIMES DAILY BEFORE MEALS AND nightly AS NEEDED], Disp: , Rfl:     tiZANidine (ZANAFLEX) 4 MG tablet, Take 4 mg by mouth nightly as needed., Disp: , Rfl:     ROS:     Constitutional:  Denies fever or chills   Eyes:  Denies change in visual acuity   HENT:  Denies nasal congestion or sore throat   Respiratory:  As in the history of the present illness.   Cardiovascular:  As in the history of the present illness.   GI:  As in the history of the present illness.    Musculoskeletal:  Denies back pain or joint pain   Integument:  Denies rash   Neurologic:  Denies  "headache, focal weakness or sensory changes   Endocrine:  Denies polyuria or polydipsia   Lymphatic:  Denies swollen glands   Psychiatric:  Denies depression or anxiety    Physical Exam:     Vitals:   Vitals:    05/30/23 1406   BP: 135/67   Pulse: 65   SpO2: 97%   Weight: 60.8 kg (134 lb)   Height: 5' 4" (1.626 m)   PainSc: 0-No pain       Body mass index is 23 kg/m².    Constitutional:  Well developed, well nourished, and in no acute distress   Eyes:  PERRLA, conjunctiva normal   HENT:  Atraumatic, external ears normal, nose normal.  Neck: There is no jugular venous distension or thyromegaly.   Respiratory:  No respiratory distress, normal breath sounds, no rales, no wheezing   Cardiovascular:  Normal rate,and a regular rhythm, no murmurs, no gallops, no rub, and trace bilateral ankle edema.  GI:  Normal bowel sounds.  Musculoskeletal:  No deformities.   Neurologic:  Alert & oriented x 3, CN 2-12 normal, normal motor function, and no asterixis.   Psychiatric:  Speech and behavior appropriate.      Labs/Tests:    Date:  05/26/2023    Na/K/Cl/CO2 = 140/4.6/107/24  BUN/Creat/GFR = 46/3.23/14  Ca/Phos/Alb/PTH = 9.1/4.3/4.4/485  U Prot/creat = 1.4  Hgb = 10.5      Follow Up:      Return for follow-up in 4 months with repeat kidney lab work done before the visit.      This note was created using the voice recognition software currently available to the Medical Staff of the [facility] and its health care facilities. All of the best efforts undertaken to edit the product of that use shall necessarily fall short from time to time. Viewers and reviewers of the product of its use are encouraged to contact this provider for clarification when, and if, the product message is unclear.        "

## 2023-10-09 ENCOUNTER — OFFICE VISIT (OUTPATIENT)
Dept: NEPHROLOGY | Facility: CLINIC | Age: 78
End: 2023-10-09
Payer: MEDICARE

## 2023-10-09 VITALS
HEART RATE: 69 BPM | WEIGHT: 136 LBS | SYSTOLIC BLOOD PRESSURE: 152 MMHG | OXYGEN SATURATION: 98 % | HEIGHT: 64 IN | BODY MASS INDEX: 23.22 KG/M2 | DIASTOLIC BLOOD PRESSURE: 82 MMHG

## 2023-10-09 DIAGNOSIS — N18.5 ANEMIA IN STAGE 5 CHRONIC KIDNEY DISEASE, NOT ON CHRONIC DIALYSIS: ICD-10-CM

## 2023-10-09 DIAGNOSIS — D63.1 ANEMIA IN STAGE 5 CHRONIC KIDNEY DISEASE, NOT ON CHRONIC DIALYSIS: ICD-10-CM

## 2023-10-09 DIAGNOSIS — N25.81 SECONDARY HYPERPARATHYROIDISM OF RENAL ORIGIN: ICD-10-CM

## 2023-10-09 DIAGNOSIS — N18.5 CKD (CHRONIC KIDNEY DISEASE), SYMPTOM MANAGEMENT ONLY, STAGE 5: Primary | ICD-10-CM

## 2023-10-09 DIAGNOSIS — R80.9 NON-NEPHROTIC RANGE PROTEINURIA: ICD-10-CM

## 2023-10-09 DIAGNOSIS — I10 PRIMARY HYPERTENSION: ICD-10-CM

## 2023-10-09 DIAGNOSIS — Z79.1 LONG TERM (CURRENT) USE OF NON-STEROIDAL ANTI-INFLAMMATORIES (NSAID): ICD-10-CM

## 2023-10-09 PROCEDURE — 3077F PR MOST RECENT SYSTOLIC BLOOD PRESSURE >= 140 MM HG: ICD-10-PCS | Mod: CPTII,S$GLB,, | Performed by: INTERNAL MEDICINE

## 2023-10-09 PROCEDURE — 3079F DIAST BP 80-89 MM HG: CPT | Mod: CPTII,S$GLB,, | Performed by: INTERNAL MEDICINE

## 2023-10-09 PROCEDURE — 3288F FALL RISK ASSESSMENT DOCD: CPT | Mod: CPTII,S$GLB,, | Performed by: INTERNAL MEDICINE

## 2023-10-09 PROCEDURE — 1101F PT FALLS ASSESS-DOCD LE1/YR: CPT | Mod: CPTII,S$GLB,, | Performed by: INTERNAL MEDICINE

## 2023-10-09 PROCEDURE — 1126F PR PAIN SEVERITY QUANTIFIED, NO PAIN PRESENT: ICD-10-PCS | Mod: CPTII,S$GLB,, | Performed by: INTERNAL MEDICINE

## 2023-10-09 PROCEDURE — 1101F PR PT FALLS ASSESS DOC 0-1 FALLS W/OUT INJ PAST YR: ICD-10-PCS | Mod: CPTII,S$GLB,, | Performed by: INTERNAL MEDICINE

## 2023-10-09 PROCEDURE — 1126F AMNT PAIN NOTED NONE PRSNT: CPT | Mod: CPTII,S$GLB,, | Performed by: INTERNAL MEDICINE

## 2023-10-09 PROCEDURE — 3077F SYST BP >= 140 MM HG: CPT | Mod: CPTII,S$GLB,, | Performed by: INTERNAL MEDICINE

## 2023-10-09 PROCEDURE — 1159F PR MEDICATION LIST DOCUMENTED IN MEDICAL RECORD: ICD-10-PCS | Mod: CPTII,S$GLB,, | Performed by: INTERNAL MEDICINE

## 2023-10-09 PROCEDURE — 3288F PR FALLS RISK ASSESSMENT DOCUMENTED: ICD-10-PCS | Mod: CPTII,S$GLB,, | Performed by: INTERNAL MEDICINE

## 2023-10-09 PROCEDURE — 1159F MED LIST DOCD IN RCRD: CPT | Mod: CPTII,S$GLB,, | Performed by: INTERNAL MEDICINE

## 2023-10-09 PROCEDURE — 99214 OFFICE O/P EST MOD 30 MIN: CPT | Mod: S$GLB,,, | Performed by: INTERNAL MEDICINE

## 2023-10-09 PROCEDURE — 3079F PR MOST RECENT DIASTOLIC BLOOD PRESSURE 80-89 MM HG: ICD-10-PCS | Mod: CPTII,S$GLB,, | Performed by: INTERNAL MEDICINE

## 2023-10-09 PROCEDURE — 99214 PR OFFICE/OUTPT VISIT, EST, LEVL IV, 30-39 MIN: ICD-10-PCS | Mod: S$GLB,,, | Performed by: INTERNAL MEDICINE

## 2023-10-09 RX ORDER — APIXABAN 2.5 MG/1
2.5 TABLET, FILM COATED ORAL 2 TIMES DAILY
COMMUNITY
Start: 2023-06-30

## 2023-10-09 NOTE — PATIENT INSTRUCTIONS
The patient has been provided with her current level of kidney function and she is asked to return for follow-up, this time, in 3 months with repeat kidney lab work done before the visit.    In the meantime, she is reminded that she must take in 72 oz of fluid a day if she is to avoid dehydration and a further decrease in kidney function as a consequence.

## 2023-10-09 NOTE — PROGRESS NOTES
Pt Name:  Kateryna De Guzman  Pt :  1945  Pt MRN:  [id number]    Date:  10/18/2022    Reason for visit:     Follow up visit for Chronic Kidney Disease.    Serum creatinine 3.58, GFR 12, CKD stage 5.    Chief Complaint:     The patient denies any complaints today.      Assessment & Plan:      Problem #1.  Primary Hypertension    Assessment:     Controlled   Plan:     2 g sodium diet, amlodipine 10 mg by mouth daily, hydralazine 25 mg by mouth twice daily, metoprolol 50 mg by mouth twice daily.     Problem #2.  Long-term NSAID Use    Assessment:     Contributing to some extent to the development of the chronic kidney disease.     Plan:    The patient has been advised and continues to avoid the use of NSAIDs.     Problem #3.  Chronic Kidney Disease Stage 4    Assessment:     Stable and asymptomatic   Plan:    Conservative, non-dialysis, nephrologic managements.    Continue renal nutrition and fluid managements.    Continue to provide the patient with verbal instruction and reading material regarding the chronic kidney disease condition, and especially its associated cardiovascular risks.    Return for follow-up in 3 months with repeat kidney lab work done before the visit.     Problem #4.  Anemia in Chronic Kidney Disease    Assessment:    With the patient currently asymptomatic    A portion of the anemia from time to time is contributed to iron deficiency presumed due to the effects of her platelet inhibitor at the GI level.     Plan:    Repeat hemogram prior to the visit in 3 months.     Problem #5.  Secondary Hyperparathyroidism of Renal Origin.    Assessment:    Treated and asymptomatic at   Plan:    Calcitriol 0.25 mcg by mouth daily.    Repeat calcium, phosphorus, albumin, and intact PTH level prior to the visit in 3 months.     Problem #6.  Non-Nephrotic Range Proteinuria    Assessment:    Asymptomatic   Plan:     Repeat random urine protein/creatinine prior to the visit in 3 months.     Problem #6.   Hyperkalemia    Assessment:    Asymptomatic   Plan:   Patient has been provided with a low-potassium diet.      Repeat serum potassium prior to the visit in 3 months.       HPI:    This is the 23rd [facility] visit for this 78-year-old woman referred by Dr. Dimas Cole on 08/26/2015 for further evaluation of reduced kidney function;the most recent blood testing demonstrating a serum creatinine of 1.6 with a calculated glomerular filtration rate of 33 mL/min.at that time.    At presentation to the clinic today for followup of the status of her kidney function, she does not have azotemic symptoms. Specifically, she does not have absent appetite, nausea , dyspnea at rest or with modest exertion, shortness of breath awakening her from sleep, or difficulty formulating her thoughts or expressing herself.      From the standpoint of the risk factors for the development of Chronic Kidney Disease, she has an at least 20 year history of hypertension. In addition, she related at the time of her initial visit in 2015, that for about the previous 2 years she had been using Aleve PM at bedtime for sleep. She had ceased smoking inhaled tobacco products 2 years previously. She did have known hypercholesterolemia requiring treatment, and a cardiac catheterization had demonstrated a small coronary artery with significant obstruction that was not amenable to intervention. There was not a significant family history of kidney disease of patient is aware. She had no azotemic symptoms at that time.       History:     Past Medical History:   Diagnosis Date    Chronic kidney disease, unspecified     Diverticulosis     Essential (primary) hypertension     Gastritis 08/05/2014    GERD (gastroesophageal reflux disease)     Internal hemorrhoids     Irritable bowel syndrome without diarrhea     Mixed hyperlipidemia     Paroxysmal atrial fibrillation     Personal history of colonic polyps 12/09/2008    HYPERPLASTIC  POLYPS    Personal history of colonic polyps 2011    TUBULAR ADENOMA,MULTIPLE HYPERPLASTIC POLYPS    Personal history of colonic polyps 2014    3 hyperplastic    Personal history of colonic polyps 2017    2 hyperplastic,1 tubular adenoma    Personal history of colonic polyps 2019    2 tubular adenomous, 2 hyperplastic     Past Surgical History:   Procedure Laterality Date    CARDIAC CATHETERIZATION      CARDIAC PACEMAKER PLACEMENT  2016    CARDIOVERSION      CATARACT EXTRACTION      CHOLECYSTECTOMY      COLONOSCOPY      19, 2017,2014,2011,2008    ESOPHAGOGASTRODUODENOSCOPY      19, 2017,2014    HYSTERECTOMY       Family History   Problem Relation Age of Onset    Kidney disease Mother     Cancer Father         colon    Heart failure Father      Social History     Substance and Sexual Activity   Alcohol Use Yes    Alcohol/week: 2.0 standard drinks of alcohol    Types: 2 Glasses of wine per week     Social History     Substance and Sexual Activity   Drug Use Not Currently     Social History     Substance and Sexual Activity   Sexual Activity Not Currently     reports that she is not currently sexually active.  Social History     Tobacco Use   Smoking Status Former    Current packs/day: 0.00    Types: Cigarettes    Quit date: 2012    Years since quittin.7   Smokeless Tobacco Never       Allergies:    Review of patient's allergies indicates:   Allergen Reactions    Iodine and iodide containing products     Levofloxacin Rash    Penicillins Rash         Current Outpatient Medications:     amiodarone (PACERONE) 200 MG Tab, Take 1 tablet by mouth once daily., Disp: , Rfl:     amLODIPine (NORVASC) 10 MG tablet, Take 1 tablet by mouth once daily., Disp: , Rfl:     calcitRIOL (ROCALTROL) 0.25 MCG Cap, Take 1 capsule by mouth once daily., Disp: , Rfl:     desvenlafaxine succinate (PRISTIQ) 100 MG Tb24, Take 75 mg by mouth once daily., Disp: , Rfl:      ELIQUIS 2.5 mg Tab, Take 2.5 mg by mouth 2 (two) times daily., Disp: , Rfl:     esomeprazole (NEXIUM) 40 MG capsule, Take 40 mg by mouth 2 (two) times a day., Disp: , Rfl:     famotidine (PEPCID) 40 MG tablet, Take 40 mg by mouth once daily., Disp: , Rfl:     ferrous sulfate (FEOSOL) 325 mg (65 mg iron) Tab tablet, Take 325 mg by mouth once daily at 6am., Disp: , Rfl:     hydrALAZINE (APRESOLINE) 25 MG tablet, Take 25 mg by mouth 2 (two) times a day., Disp: , Rfl:     metoprolol tartrate (LOPRESSOR) 50 MG tablet, Take 1 tablet by mouth 2 (two) times daily., Disp: , Rfl:     NON FORMULARY MEDICATION, Take 1 tablet by mouth once daily. prevagen, Disp: , Rfl:     omega-3 fatty acids-fish oil 340-1,000 mg Cap, Take 2 capsules by mouth 2 (two) times a day., Disp: , Rfl:     sucralfate (CARAFATE) 100 mg/mL suspension, TAKE 10MLs BY MOUTH FOUR TIMES DAILY BEFORE MEALS AND nightly AS NEEDED], Disp: , Rfl:     vitamins  A,C,E-zinc-copper [phone] unit-mg-unit Cap, Take 2 capsules by mouth once daily., Disp: , Rfl:     dabigatran etexilate (PRADAXA) 75 mg Cap, Take 1 capsule by mouth once daily., Disp: , Rfl:     furosemide (LASIX) 20 MG tablet, Take 20 mg by mouth once daily., Disp: , Rfl:     hydrocortisone 2.5 % cream, Apply 1 application topically 2 (two) times daily., Disp: , Rfl:     ondansetron (ZOFRAN-ODT) 4 MG TbDL, Take 4 mg by mouth every 8 (eight) hours as needed., Disp: , Rfl:     suvorexant (BELSOMRA) 10 mg Tab, Take by mouth every evening., Disp: , Rfl:     tiZANidine (ZANAFLEX) 4 MG tablet, Take 4 mg by mouth nightly as needed., Disp: , Rfl:     ROS:     Constitutional:  Denies fever or chills   Eyes:  Denies change in visual acuity   HENT:  Denies nasal congestion or sore throat   Respiratory:  As in the history of the present illness.   Cardiovascular:  As in the history of the present illness.   GI:  As in the history of the present illness.    Musculoskeletal:  Denies back pain or joint pain  "  Integument:  Denies rash   Neurologic:  Denies headache, focal weakness or sensory changes   Endocrine:  Denies polyuria or polydipsia   Lymphatic:  Denies swollen glands   Psychiatric:  Denies depression or anxiety    Physical Exam:     Vitals:   Vitals:    10/09/23 1359   BP: (!) 152/82   Pulse: 69   SpO2: 98%   Weight: 61.7 kg (136 lb)   Height: 5' 4" (1.626 m)   PainSc: 0-No pain       Body mass index is 23.34 kg/m².    Constitutional:  Well developed, well nourished, and in no acute distress   Eyes:  PERRLA, conjunctiva normal   HENT:  Atraumatic, external ears normal, nose normal.  Neck: There is no jugular venous distension or thyromegaly.   Respiratory:  No respiratory distress, normal breath sounds, no rales, no wheezing   Cardiovascular:  Normal rate,and a regular rhythm, no murmurs, no gallops, no rub, and trace bilateral ankle edema.  GI:  Normal bowel sounds.  Musculoskeletal:  No deformities.   Neurologic:  Alert & oriented x 3, CN 2-12 normal, normal motor function, and no asterixis.   Psychiatric:  Speech and behavior appropriate.      Labs/Tests:    Date:  10/09/2023    Na/K/Cl/CO2 = 140/4.2/106/26  BUN/Creat/GFR = 47/3.58/12  Ca/Phos/Alb/PTH = 9.5/4.8/4.4/282  U Prot/creat = 1.0  Hgb = 10.5      Follow Up:      Return for follow-up in 4 months with repeat kidney lab work done before the visit.      This note was created using the voice recognition software currently available to the Medical Staff of the [facility] and its health care facilities. All of the best efforts undertaken to edit the product of that use shall necessarily fall short from time to time. Viewers and reviewers of the product of its use are encouraged to contact this provider for clarification when, and if, the product message is unclear.        "